# Patient Record
Sex: FEMALE | Race: WHITE | Employment: FULL TIME | URBAN - METROPOLITAN AREA
[De-identification: names, ages, dates, MRNs, and addresses within clinical notes are randomized per-mention and may not be internally consistent; named-entity substitution may affect disease eponyms.]

---

## 2023-10-24 ENCOUNTER — HOSPITAL ENCOUNTER (EMERGENCY)
Age: 18
Discharge: HOME | End: 2023-10-24
Attending: EMERGENCY MEDICINE
Payer: MEDICAID

## 2023-10-24 ENCOUNTER — APPOINTMENT (OUTPATIENT)
Dept: CT IMAGING | Age: 18
End: 2023-10-24
Payer: MEDICAID

## 2023-10-24 VITALS
WEIGHT: 135 LBS | BODY MASS INDEX: 23.05 KG/M2 | OXYGEN SATURATION: 99 % | RESPIRATION RATE: 18 BRPM | SYSTOLIC BLOOD PRESSURE: 107 MMHG | TEMPERATURE: 98.4 F | HEIGHT: 64 IN | DIASTOLIC BLOOD PRESSURE: 63 MMHG | HEART RATE: 78 BPM

## 2023-10-24 DIAGNOSIS — R51.9 ACUTE NONINTRACTABLE HEADACHE, UNSPECIFIED HEADACHE TYPE: Primary | ICD-10-CM

## 2023-10-24 DIAGNOSIS — G43.829 MENSTRUAL MIGRAINE WITHOUT STATUS MIGRAINOSUS, NOT INTRACTABLE: ICD-10-CM

## 2023-10-24 LAB
ALBUMIN SERPL-MCNC: 4.6 G/DL (ref 3.5–5)
ALBUMIN/GLOB SERPL: 1.8 (ref 0.4–1.6)
ALP SERPL-CCNC: 67 U/L (ref 45–117)
ALT SERPL-CCNC: 7 U/L (ref 13–61)
ANION GAP SERPL CALC-SCNC: 11 MMOL/L (ref 2–11)
AST SERPL-CCNC: 18 U/L (ref 15–37)
BASOPHILS # BLD: 0 K/UL (ref 0–0.2)
BASOPHILS NFR BLD: 1 % (ref 0–2)
BILIRUB SERPL-MCNC: 0.3 MG/DL (ref 0.2–1.1)
BUN SERPL-MCNC: 14 MG/DL (ref 6–23)
CALCIUM SERPL-MCNC: 9.4 MG/DL (ref 8.3–10.4)
CHLORIDE SERPL-SCNC: 104 MMOL/L (ref 98–107)
CO2 SERPL-SCNC: 27 MMOL/L (ref 21–32)
CREAT SERPL-MCNC: 0.69 MG/DL (ref 0.6–1)
DIFFERENTIAL METHOD BLD: ABNORMAL
EKG ATRIAL RATE: 77 BPM
EKG DIAGNOSIS: NORMAL
EKG P AXIS: 46 DEGREES
EKG P-R INTERVAL: 151 MS
EKG Q-T INTERVAL: 367 MS
EKG QRS DURATION: 98 MS
EKG QTC CALCULATION (BAZETT): 416 MS
EKG R AXIS: 45 DEGREES
EKG T AXIS: -20 DEGREES
EKG VENTRICULAR RATE: 77 BPM
EOSINOPHIL # BLD: 0.2 K/UL (ref 0–0.8)
EOSINOPHIL NFR BLD: 4 % (ref 0.5–7.8)
ERYTHROCYTE [DISTWIDTH] IN BLOOD BY AUTOMATED COUNT: 12.2 % (ref 11.9–14.6)
GLOBULIN SER CALC-MCNC: 2.6 G/DL (ref 2.8–4.5)
GLUCOSE BLD STRIP.AUTO-MCNC: 79 MG/DL (ref 65–100)
GLUCOSE SERPL-MCNC: 94 MG/DL (ref 65–100)
HCT VFR BLD AUTO: 40.6 % (ref 35.8–46.3)
HGB BLD-MCNC: 13.7 G/DL (ref 11.7–15.4)
IMM GRANULOCYTES # BLD AUTO: 0 K/UL (ref 0–0.5)
IMM GRANULOCYTES NFR BLD AUTO: 0 % (ref 0–5)
INR PPP: 1
LYMPHOCYTES # BLD: 1.2 K/UL (ref 0.5–4.6)
LYMPHOCYTES NFR BLD: 22 % (ref 13–44)
MCH RBC QN AUTO: 30.6 PG (ref 26.1–32.9)
MCHC RBC AUTO-ENTMCNC: 33.7 G/DL (ref 31.4–35)
MCV RBC AUTO: 90.6 FL (ref 82–102)
MONOCYTES # BLD: 0.6 K/UL (ref 0.1–1.3)
MONOCYTES NFR BLD: 11 % (ref 4–12)
NEUTS SEG # BLD: 3.3 K/UL (ref 1.7–8.2)
NEUTS SEG NFR BLD: 62 % (ref 43–78)
NRBC # BLD: 0 K/UL (ref 0–0.2)
PLATELET # BLD AUTO: 220 K/UL (ref 150–450)
PMV BLD AUTO: 9.3 FL (ref 9.4–12.3)
POTASSIUM SERPL-SCNC: 3.6 MMOL/L (ref 3.5–5.1)
PROT SERPL-MCNC: 7.2 G/DL (ref 6.4–8.2)
PROTHROMBIN TIME: 13.3 SEC (ref 12.6–14.3)
RBC # BLD AUTO: 4.48 M/UL (ref 4.05–5.2)
SARS-COV-2 RDRP RESP QL NAA+PROBE: NOT DETECTED
SERVICE CMNT-IMP: NORMAL
SODIUM SERPL-SCNC: 142 MMOL/L (ref 133–143)
SOURCE: NORMAL
WBC # BLD AUTO: 5.2 K/UL (ref 4.3–11.1)

## 2023-10-24 PROCEDURE — 93005 ELECTROCARDIOGRAM TRACING: CPT | Performed by: EMERGENCY MEDICINE

## 2023-10-24 PROCEDURE — 6370000000 HC RX 637 (ALT 250 FOR IP): Performed by: EMERGENCY MEDICINE

## 2023-10-24 PROCEDURE — 99284 EMERGENCY DEPT VISIT MOD MDM: CPT

## 2023-10-24 PROCEDURE — 82962 GLUCOSE BLOOD TEST: CPT

## 2023-10-24 PROCEDURE — 2580000003 HC RX 258: Performed by: EMERGENCY MEDICINE

## 2023-10-24 PROCEDURE — 85610 PROTHROMBIN TIME: CPT

## 2023-10-24 PROCEDURE — 96375 TX/PRO/DX INJ NEW DRUG ADDON: CPT

## 2023-10-24 PROCEDURE — 87635 SARS-COV-2 COVID-19 AMP PRB: CPT

## 2023-10-24 PROCEDURE — 6360000002 HC RX W HCPCS: Performed by: EMERGENCY MEDICINE

## 2023-10-24 PROCEDURE — 70450 CT HEAD/BRAIN W/O DYE: CPT

## 2023-10-24 PROCEDURE — 80053 COMPREHEN METABOLIC PANEL: CPT

## 2023-10-24 PROCEDURE — 96374 THER/PROPH/DIAG INJ IV PUSH: CPT

## 2023-10-24 PROCEDURE — 85025 COMPLETE CBC W/AUTO DIFF WBC: CPT

## 2023-10-24 RX ORDER — KETOROLAC TROMETHAMINE 30 MG/ML
30 INJECTION, SOLUTION INTRAMUSCULAR; INTRAVENOUS
Status: COMPLETED | OUTPATIENT
Start: 2023-10-24 | End: 2023-10-24

## 2023-10-24 RX ORDER — PROCHLORPERAZINE MALEATE 10 MG
10 TABLET ORAL EVERY 6 HOURS PRN
Qty: 12 TABLET | Refills: 0 | Status: SHIPPED | OUTPATIENT
Start: 2023-10-24

## 2023-10-24 RX ORDER — SUMATRIPTAN 50 MG/1
50 TABLET, FILM COATED ORAL
Qty: 10 TABLET | Refills: 0 | Status: SHIPPED | OUTPATIENT
Start: 2023-10-24 | End: 2023-10-24

## 2023-10-24 RX ORDER — BUTALBITAL, ACETAMINOPHEN AND CAFFEINE 50; 325; 40 MG/1; MG/1; MG/1
2 TABLET ORAL
Status: COMPLETED | OUTPATIENT
Start: 2023-10-24 | End: 2023-10-24

## 2023-10-24 RX ORDER — BUTALBITAL, ACETAMINOPHEN AND CAFFEINE 300; 40; 50 MG/1; MG/1; MG/1
2 CAPSULE ORAL EVERY 6 HOURS PRN
Qty: 20 CAPSULE | Refills: 0 | Status: SHIPPED | OUTPATIENT
Start: 2023-10-24

## 2023-10-24 RX ORDER — ONDANSETRON 2 MG/ML
4 INJECTION INTRAMUSCULAR; INTRAVENOUS
Status: COMPLETED | OUTPATIENT
Start: 2023-10-24 | End: 2023-10-24

## 2023-10-24 RX ORDER — IBUPROFEN 800 MG/1
800 TABLET ORAL EVERY 8 HOURS PRN
Qty: 21 TABLET | Refills: 0 | Status: SHIPPED | OUTPATIENT
Start: 2023-10-24

## 2023-10-24 RX ORDER — BUTALBITAL, ACETAMINOPHEN AND CAFFEINE 50; 325; 40 MG/1; MG/1; MG/1
2 TABLET ORAL
Status: DISCONTINUED | OUTPATIENT
Start: 2023-10-24 | End: 2023-10-24

## 2023-10-24 RX ORDER — 0.9 % SODIUM CHLORIDE 0.9 %
1000 INTRAVENOUS SOLUTION INTRAVENOUS ONCE
Status: COMPLETED | OUTPATIENT
Start: 2023-10-24 | End: 2023-10-24

## 2023-10-24 RX ADMIN — ONDANSETRON 4 MG: 2 INJECTION INTRAMUSCULAR; INTRAVENOUS at 19:44

## 2023-10-24 RX ADMIN — SODIUM CHLORIDE 1000 ML: 9 INJECTION, SOLUTION INTRAVENOUS at 19:43

## 2023-10-24 RX ADMIN — KETOROLAC TROMETHAMINE 30 MG: 30 INJECTION, SOLUTION INTRAMUSCULAR at 19:45

## 2023-10-24 RX ADMIN — BUTALBITAL, ACETAMINOPHEN, AND CAFFEINE 2 TABLET: 50; 325; 40 TABLET ORAL at 19:47

## 2023-10-24 ASSESSMENT — PAIN SCALES - GENERAL
PAINLEVEL_OUTOF10: 8
PAINLEVEL_OUTOF10: 6

## 2023-10-24 ASSESSMENT — LIFESTYLE VARIABLES
HOW MANY STANDARD DRINKS CONTAINING ALCOHOL DO YOU HAVE ON A TYPICAL DAY: PATIENT DOES NOT DRINK
HOW OFTEN DO YOU HAVE A DRINK CONTAINING ALCOHOL: NEVER

## 2023-10-24 ASSESSMENT — PAIN DESCRIPTION - LOCATION: LOCATION: HEAD

## 2023-10-24 ASSESSMENT — PAIN - FUNCTIONAL ASSESSMENT: PAIN_FUNCTIONAL_ASSESSMENT: 0-10

## 2023-10-24 NOTE — ED TRIAGE NOTES
Pt ambulatory to triage with c/o sudden vision loss at [id number] while at work and states vision was fuzzy x 30 mins. . Pt reports she got really hot, dizzy and now has a frontal, throbbing headache and feels 'out of it'. Pt reports vision has returned to normal.     Dr. Arabella Everett aware of pt's sx and onset. Dr. Arabella Everett is seeing pt in triage at [address] [place] [address].

## 2023-10-25 LAB
EKG ATRIAL RATE: 77 BPM
EKG DIAGNOSIS: NORMAL
EKG P AXIS: 46 DEGREES
EKG P-R INTERVAL: 151 MS
EKG Q-T INTERVAL: 367 MS
EKG QRS DURATION: 98 MS
EKG QTC CALCULATION (BAZETT): 416 MS
EKG R AXIS: 45 DEGREES
EKG T AXIS: -20 DEGREES
EKG VENTRICULAR RATE: 77 BPM

## 2023-10-25 PROCEDURE — 93010 ELECTROCARDIOGRAM REPORT: CPT | Performed by: INTERNAL MEDICINE

## 2023-10-25 NOTE — DISCHARGE INSTRUCTIONS
Be sure and keep track of when/how often you get headaches and whether there is severe like this 1 and a month ago, or if they are mild     for next migraine, start with two excedrin migraines,  If no better proceed to two fioricet  Drink lots of water, extra caffeine may help as well. If still no better, try the compazine,  You may also try the compazine at any point if you become nauseated  Lastly try the _imitrex_ as a last result as it is a pure migraine medicine, and also happens to be the most expensive part of this regimen, so try it last    If still hurting, or can not keep medicines down by mouth, - return to the e.r.

## 2023-10-30 ASSESSMENT — ENCOUNTER SYMPTOMS
SHORTNESS OF BREATH: 0
NAUSEA: 1
RHINORRHEA: 0
EYE DISCHARGE: 0
EYE REDNESS: 0
COUGH: 0
COLOR CHANGE: 0
ABDOMINAL PAIN: 0
VOMITING: 0
BACK PAIN: 0
FACIAL SWELLING: 0

## 2023-10-30 NOTE — ED PROVIDER NOTES
Emergency Department Provider Note       PCP: Harrison Medrano MD   Age: 19 y.o. Sex: female     DISPOSITION Decision To Discharge 10/24/2023 08:40:45 PM       ICD-10-CM    1. Acute nonintractable headache, unspecified headache type  R51.9       2. Menstrual migraine without status migrainosus, not intractable  G43.829           Medical Decision Making     Complexity of Problems Addressed:  1 or more acute illnesses that pose a threat to life or bodily function. Data Reviewed and Analyzed:   I independently ordered and reviewed each unique test.  I reviewed external records: ED visit note from an outside group. I reviewed external records: provider visit note from PCP. The patients assessment required an independent historian: Family member at bedside. The reason they were needed is important historical information not provided by the patient. I independently ordered and interpreted the ED EKG in the absence of a Cardiologist.    Rate: 77  EKG Interpretation: EKG Interpretation: sinus rhythm, no evidence of arrhythmia, no acute changes, and non-specific EKG  ST Segments: Normal ST segments - NO STEMI      I interpreted the CT Scan CT scan of the head negative for acute infarct, or hemorrhage. Discussion of management or test interpretation. Acute onset of headache with vision loss, suspect perimenstrual migraines neurology in. Patient will be discharged home with migraine medicines, headache feeling much better after medicines here  The management of this patient was discussed with an external consultant. Risk of Complications and/or Morbidity of Patient Management:  Prescription drug management performed. Patient was discharged risks and benefits of hospitalization were considered. Discussion with external consultants. Shared medical decision making was utilized in creating the patients health plan today. Considerations: The following items were considered but not ordered: MRI.

## 2024-03-22 ENCOUNTER — HOSPITAL ENCOUNTER (EMERGENCY)
Age: 19
Discharge: HOME | End: 2024-03-22
Attending: STUDENT IN AN ORGANIZED HEALTH CARE EDUCATION/TRAINING PROGRAM
Payer: MEDICAID

## 2024-03-22 ENCOUNTER — APPOINTMENT (OUTPATIENT)
Dept: GENERAL RADIOLOGY | Age: 19
End: 2024-03-22
Payer: MEDICAID

## 2024-03-22 VITALS
HEIGHT: 65 IN | TEMPERATURE: 99 F | HEART RATE: 95 BPM | RESPIRATION RATE: 18 BRPM | WEIGHT: 140 LBS | DIASTOLIC BLOOD PRESSURE: 80 MMHG | OXYGEN SATURATION: 100 % | SYSTOLIC BLOOD PRESSURE: 102 MMHG | BODY MASS INDEX: 23.32 KG/M2

## 2024-03-22 DIAGNOSIS — J06.9 VIRAL UPPER RESPIRATORY TRACT INFECTION WITH COUGH: Primary | ICD-10-CM

## 2024-03-22 LAB
FLUAV RNA SPEC QL NAA+PROBE: NOT DETECTED
FLUBV RNA SPEC QL NAA+PROBE: NOT DETECTED
SARS-COV-2 RDRP RESP QL NAA+PROBE: NOT DETECTED
SOURCE: NORMAL

## 2024-03-22 PROCEDURE — 99284 EMERGENCY DEPT VISIT MOD MDM: CPT

## 2024-03-22 PROCEDURE — 87635 SARS-COV-2 COVID-19 AMP PRB: CPT

## 2024-03-22 PROCEDURE — 71046 X-RAY EXAM CHEST 2 VIEWS: CPT

## 2024-03-22 PROCEDURE — 87502 INFLUENZA DNA AMP PROBE: CPT

## 2024-03-22 RX ORDER — BROMPHENIRAMINE MALEATE, PSEUDOEPHEDRINE HYDROCHLORIDE, AND DEXTROMETHORPHAN HYDROBROMIDE 2; 30; 10 MG/5ML; MG/5ML; MG/5ML
2.5 SYRUP ORAL 4 TIMES DAILY PRN
Qty: 118 ML | Refills: 0 | Status: SHIPPED | OUTPATIENT
Start: 2024-03-22

## 2024-03-22 ASSESSMENT — PAIN - FUNCTIONAL ASSESSMENT: PAIN_FUNCTIONAL_ASSESSMENT: NONE - DENIES PAIN

## 2024-03-23 NOTE — ED NOTES
I have reviewed discharge instructions with the patient.  The patient verbalized understanding.    Patient left ED via Discharge Method: ambulatory to Home with friend    Opportunity for questions and clarification provided.       Patient given 1 scripts.         To continue your aftercare when you leave the hospital, you may receive an automated call from our care team to check in on how you are doing.  This is a free service and part of our promise to provide the best care and service to meet your aftercare needs.” If you have questions, or wish to unsubscribe from this service please call [phone].  Thank you for Choosing our [facility] Emergency Department.

## 2024-03-23 NOTE — ED PROVIDER NOTES
[facility]  Free-standing Emergency Department    DISPOSITION Decision To Discharge 03/22/2024 10:21:36 PM       ICD-10-CM    1. Viral upper respiratory tract infection with cough  J06.9         ED Course    19-year-old female no pertinent past medical history presents with 2 days of upper respiratory symptoms.  Vitals reassuring.  Viral swabs negative.  X-ray imaging negative for pneumonia.  Stable for discharge home with symptomatic therapy.  Return precautions to    Data Reviewed and Analyzed:  1 acute, uncomplicated illness or injury.  Prescription drug management performed.    I independently ordered and reviewed each unique test.   I interpreted the X-rays no obvious pneumothorax.  I interpreted the labs.  The patient has an Upper Respiratory Infection.  Antibiotics were not prescribed.       SOO Dupont is a 19 y.o. female with a history of none who presents to the ED with complaint of \"chest congestion\".  Reports a 2-day history of upper respiratory symptoms including cough, rhinorrhea.  States multiple coworkers have been reportedly diagnosed with pneumonia and she is concerned she has the same.  No medications prior to arrival.  No fevers.  Tolerating p.o. without difficulty    History   No past medical history on file.  No past surgical history on file.  No family history on file.  No Known Allergies    Physical Exam     Vitals:    03/22/24 2157 03/22/24 2307   BP: 98/82 102/80   Pulse: 90 95   Resp: 18 18   Temp: 99 °F (37.2 °C)    TempSrc: Oral    SpO2:  100%   Weight: 63.5 kg (140 lb)    Height: 1.651 m (5' 5\")      Nursing note and vitals reviewed.    Constitutional: Well developed, NAD  HEENT: Atraumatic, conjugate gaze, EOM intact  Neck: Supple  Cardiovascular: No cyanosis, diaphoresis, or JVD appreciated.  Respiratory: Effort normal. No respiratory distress. Lungs CTAB.  No obvious wheezes  Gastrointestinal: Non-distended.   MSK: No deformities appreciated. No

## 2024-03-23 NOTE — ED TRIAGE NOTES
Patient ambulatory into ED w/steady gait.  Patient states she had a fever 3 days ago and now she is coughing up mucus that is green/yellowish.  Patient states she thinks it is \"PNA\" wants an xray. Patient has only taken tylenol for the pain which was at noon today.  Patient states daughter has been sick and was tested for covid/flu/strep and was negative.  Her daughter was sick for last 2 days but better today.

## 2024-07-11 ENCOUNTER — APPOINTMENT (RX ONLY)
Dept: URBAN - METROPOLITAN AREA CLINIC 25 | Facility: CLINIC | Age: 19
Setting detail: DERMATOLOGY
End: 2024-07-11

## 2024-07-11 DIAGNOSIS — D485 NEOPLASM OF UNCERTAIN BEHAVIOR OF SKIN: ICD-10-CM

## 2024-07-11 DIAGNOSIS — D22 MELANOCYTIC NEVI: ICD-10-CM

## 2024-07-11 PROBLEM — D48.5 NEOPLASM OF UNCERTAIN BEHAVIOR OF SKIN: Status: ACTIVE | Noted: 2024-07-11

## 2024-07-11 PROBLEM — D22.62 MELANOCYTIC NEVI OF LEFT UPPER LIMB, INCLUDING SHOULDER: Status: ACTIVE | Noted: 2024-07-11

## 2024-07-11 PROCEDURE — 11102 TANGNTL BX SKIN SINGLE LES: CPT

## 2024-07-11 PROCEDURE — ? BIOPSY BY SHAVE METHOD

## 2024-07-11 PROCEDURE — 99202 OFFICE O/P NEW SF 15 MIN: CPT | Mod: 25

## 2024-07-11 PROCEDURE — ? COUNSELING

## 2024-07-11 ASSESSMENT — LOCATION DETAILED DESCRIPTION DERM: LOCATION DETAILED: LEFT POSTERIOR SHOULDER

## 2024-07-11 ASSESSMENT — LOCATION SIMPLE DESCRIPTION DERM: LOCATION SIMPLE: LEFT SHOULDER

## 2024-07-11 ASSESSMENT — LOCATION ZONE DERM: LOCATION ZONE: ARM

## 2024-07-11 NOTE — PROCEDURE: BIOPSY BY SHAVE METHOD
Detail Level: Detailed
Depth Of Biopsy: dermis
Was A Bandage Applied: Yes
Size Of Lesion In Cm: 0
Biopsy Type: H and E
Biopsy Method: Dermablade
Anesthesia Type: 1% lidocaine with epinephrine
Anesthesia Volume In Cc: 0.5
Hemostasis: Drysol
Wound Care: Petrolatum
Dressing: bandage
Destruction After The Procedure: No
Type Of Destruction Used: Curettage
Curettage Text: The wound bed was treated with curettage after the biopsy was performed.
Cryotherapy Text: The wound bed was treated with cryotherapy after the biopsy was performed.
Electrodesiccation Text: The wound bed was treated with electrodesiccation after the biopsy was performed.
Electrodesiccation And Curettage Text: The wound bed was treated with electrodesiccation and curettage after the biopsy was performed.
Silver Nitrate Text: The wound bed was treated with silver nitrate after the biopsy was performed.
Lab: [id number]
Lab Facility: [id number]
Consent: Written consent was obtained and risks were reviewed including but not limited to scarring, infection, bleeding, scabbing, incomplete removal, nerve damage and allergy to anesthesia.
Post-Care Instructions: I reviewed with the patient in detail post-care instructions. Patient is to keep the biopsy site dry overnight, and then apply bacitracin twice daily until healed. Patient may apply hydrogen peroxide soaks to remove any crusting.
Notification Instructions: Patient will be notified of biopsy results. However, patient instructed to call the office if not contacted within 2 weeks.
Billing Type: Third-Party Bill
Information: Selecting Yes will display possible errors in your note based on the variables you have selected. This validation is only offered as a suggestion for you. PLEASE NOTE THAT THE VALIDATION TEXT WILL BE REMOVED WHEN YOU FINALIZE YOUR NOTE. IF YOU WANT TO FAX A PRELIMINARY NOTE YOU WILL NEED TO TOGGLE THIS TO 'NO' IF YOU DO NOT WANT IT IN YOUR FAXED NOTE.